# Patient Record
Sex: MALE | Race: WHITE
[De-identification: names, ages, dates, MRNs, and addresses within clinical notes are randomized per-mention and may not be internally consistent; named-entity substitution may affect disease eponyms.]

---

## 2018-08-02 ENCOUNTER — HOSPITAL ENCOUNTER (EMERGENCY)
Dept: HOSPITAL 25 - UCEAST | Age: 73
Discharge: HOME | End: 2018-08-02
Payer: COMMERCIAL

## 2018-08-02 VITALS — DIASTOLIC BLOOD PRESSURE: 71 MMHG | SYSTOLIC BLOOD PRESSURE: 140 MMHG

## 2018-08-02 DIAGNOSIS — I10: ICD-10-CM

## 2018-08-02 DIAGNOSIS — Z79.82: ICD-10-CM

## 2018-08-02 DIAGNOSIS — J40: Primary | ICD-10-CM

## 2018-08-02 PROCEDURE — 96372 THER/PROPH/DIAG INJ SC/IM: CPT

## 2018-08-02 PROCEDURE — 99212 OFFICE O/P EST SF 10 MIN: CPT

## 2018-08-02 PROCEDURE — G0463 HOSPITAL OUTPT CLINIC VISIT: HCPCS

## 2018-08-02 PROCEDURE — 71046 X-RAY EXAM CHEST 2 VIEWS: CPT

## 2018-08-02 NOTE — ED
Respiratory





- HPI Summary


HPI Summary: 


72 female presents with cough and shortness breath for the past 3 days.  He 

states he has history of chronic bronchitis.  He is nonsmoker.  He denies any 

chest pain.  No pain or swelling in calf muscles.  No sore throat.  He states 

that he has been wheezing for the past couple days.  He states is worse with 

exertion.  He does not have an inhaler.  He states wheezing is worse at night.  

He states if catches the symptoms early he normally doesn't have many issues.  

has history of HTN and DM. 








- History of Current Complaint


Chief Complaint: UCRespiratory


Stated Complaint: COUGH CONGESTION


Time Seen by Provider: 08/02/18 12:46


Pain Intensity: 5





- Allergy/Home Medications


Allergies/Adverse Reactions: 


 Allergies











Allergy/AdvReac Type Severity Reaction Status Date / Time


 


No Known Allergies Allergy   Verified 08/02/18 12:40











Home Medications: 


 Home Medications





Aspirin 81 mg CHEW TAB* [Aspirin Low Dose TAB*] 81 mg PO DAILY 08/02/18 [

History Confirmed 08/02/18]


Losartan/Hydrochlorothiazide [Losartan-Hctz 100-12.5 mg Tab] 1 mg PO DAILY WITH 

MEAL 08/02/18 [History Confirmed 08/02/18]


Rosuvastatin Calcium [Crestor] 20 mg PO DAILY WITH MEAL 08/02/18 [History 

Confirmed 08/02/18]


celeCOXIB CAP* [CeleBREX CAP*] 100 mg PO DAILY 08/02/18 [History Confirmed 08/02 /18]


hydroCHLOROthiazide [Hydrochlorothiazide] 12.5 mg PO DAILY WITH MEAL 08/02/18 [

History Confirmed 08/02/18]











PMH/Surg Hx/FS Hx/Imm Hx


Endocrine/Hematology History: Reports: Hx Diabetes - type  II


   Denies: Hx Thyroid Disease


Cardiovascular History: Reports: Hx Hypertension


Respiratory History: 


   Denies: Hx Asthma


GI History: 


   Denies: Hx Ulcer





- Surgical History


Surgery Procedure, Year, and Place: rt ankle 2/19/15 fusion  fibula graft.  pt. 

type 2 dibetic.  bilat hip replacements


Infectious Disease History: No


Infectious Disease History: 


   Denies: Hx Hepatitis, Hx Human Immunodeficiency Virus (HIV), History Other 

Infectious Disease, Traveled Outside the [place] in Last 30 Days





- Family History


Known Family History: Positive: None


   Negative: Respiratory Disease





- Social History


Alcohol Use: Occasionally


Substance Use Type: Reports: None


Smoking Status (MU): Never Smoked Tobacco





Review of Systems


Negative: Fever


Negative: Chest Pain


Positive: Shortness Of Breath, Cough


All Other Systems Reviewed And Are Negative: Yes





Physical Exam


Triage Information Reviewed: Yes


Vital Signs On Initial Exam: 


 Initial Vitals











Temp Pulse Resp BP Pulse Ox


 


 97.9 F   86   22   140/71   97 


 


 08/02/18 12:35  08/02/18 12:35  08/02/18 12:35  08/02/18 12:35  08/02/18 12:35











Vital Signs Reviewed: Yes


Appearance: Positive: Well-Appearing


Skin: Positive: Warm, Dry


Head/Face: Positive: Normal Head/Face Inspection


Eyes: Positive: Normal, EOMI, ALEENA, Conjunctiva Clear


ENT: Positive: Normal ENT inspection, Pharynx normal, TMs normal


Respiratory/Lung Sounds: Positive: Breath Sounds Present, Wheezes


Cardiovascular: Positive: Normal, RRR


Abdomen Description: Positive: Nontender, Soft


Bowel Sounds: Positive: Present


Musculoskeletal: Positive: Normal


Neurological: Positive: Normal


Psychiatric: Positive: Normal





Diagnostics





- Vital Signs


 Vital Signs











  Temp Pulse Resp BP Pulse Ox


 


 08/02/18 12:35  97.9 F  86  22  140/71  97














- Laboratory


Lab Statement: Any lab studies that have been ordered have been reviewed, and 

results considered in the medical decision making process.





- Radiology


  ** chest


Xray Interpretation: No Acute Changes


Radiology Interpretation Completed By: Radiologist





Re-Evaluation





- Re-Evaluation


  ** First Eval


Re-Evaluation Time: 13:29


Change: Improved


Comment: slight wheeze after treatment





Disposition





- Course


Course Of Treatment: 72 female presents with cough and shortness breath for the 

past 3 days.  He states he has history of chronic bronchitis.  He is nonsmoker.

  He denies any chest pain.  No pain or swelling in calf muscles.  No sore 

throat.  He states that he has been wheezing for the past couple days.  He 

states is worse with exertion.  He does not have an inhaler.  He states 

wheezing is worse at night.  on exam heart RRR. wheezing present. gave duoneb 

and improved. chest xray shows NAD. will give steriod and inhaler and 

azithrymoycin. told steriod will increase blood sugar. will have follow up with 

primary about blood pressure as has dx of htn. patient understand and agrees 

with plan.





- Differential Dx - Cardiopulmonary


Differential Diagnoses - Cardiopulmonary: Bronchitis, CHF, Influenza, Lower 

Resp Infection





- Diagnoses


Provider Diagnoses: 


 Bronchitis








Discharge





- Sign-Out/Discharge


Documenting (check all that apply): Patient Departure





- Discharge Plan


Condition: Good


Disposition: HOME


Prescriptions: 


Albuterol HFA INHALER* [Ventolin HFA Inhaler*] 2 puff INH Q4H PRN #1 mdi


 PRN Reason: Wheezing


Azithromycin TAB* [Zithromax TAB (Z-WESTON) 250 mg #6 tabs] 2 tab PO .TODAY, THEN 

1 DAILY #1 west[place]


predniSONE TAB* [Deltasone TAB*] 50 mg PO DAILY #4 tab


Patient Education Materials:  Acute Bronchitis (ED)


Referrals: 


Zulay Forbes MD [Primary Care Provider] - 


Additional Instructions: 


Use inhaler up to two puffs every 4 hours for cough and wheezing


Take steroid once a day for 5 days


Take antibiotic two tablets day one, one tablet day 2-5


Take Tylenol  for pain every 6 hours


Follow up with primary within 5 days


Return to ED if develop severe shortness of breath, worsening chest pain, or 

any new or worsening symptoms





- Billing Disposition and Condition


Condition: GOOD


Disposition: Home





Attestation Statement


User Type: Provider - I was available for consult. This patient was seen by the 

ASHLEIGH. The patient was not presented to, seen by, or examined by me. -Garrett

## 2018-08-02 NOTE — RAD
Indication: Cough, shortness of breath.



2 views of the chest including dual energy PA views demonstrate no mediastinal shift.

Heart is of normal size and configuration. When compared to previous exam of April 28, 2012 no significant change is noted.



IMPRESSION: No active disease is noted. No changes noted since April 28, 2012.

## 2018-09-04 ENCOUNTER — HOSPITAL ENCOUNTER (EMERGENCY)
Dept: HOSPITAL 25 - UCEAST | Age: 73
Discharge: HOME | End: 2018-09-04
Payer: MEDICARE

## 2018-09-04 VITALS — SYSTOLIC BLOOD PRESSURE: 136 MMHG | DIASTOLIC BLOOD PRESSURE: 87 MMHG

## 2018-09-04 DIAGNOSIS — L30.9: ICD-10-CM

## 2018-09-04 DIAGNOSIS — Z96.643: ICD-10-CM

## 2018-09-04 DIAGNOSIS — J20.9: Primary | ICD-10-CM

## 2018-09-04 PROCEDURE — G0463 HOSPITAL OUTPT CLINIC VISIT: HCPCS

## 2018-09-04 PROCEDURE — 99212 OFFICE O/P EST SF 10 MIN: CPT

## 2018-09-04 PROCEDURE — 71046 X-RAY EXAM CHEST 2 VIEWS: CPT

## 2018-09-04 NOTE — UC
Respiratory Complaint HPI





- HPI Summary


HPI Summary: 





Pleasant 73 yo gentleman c/o progressive worse cough, wheezing, green 

productive sputum over the last 2-3 weeks.  Presents today b/c not better, 

getting worse, hard to sleep through the night.  Without pnd.  No fever.  No GI 

issues.  Recently exposed to poison sumac, has been using a relative's 

triamcinolone cream, requests refill.  Recently completed azithromycin last 

month for similar sx, sx improved but returned after several days.  Albuterol 

has helped, nearly out.  No issues with blood sugars, reports that they have 

been "running ok."   





- History of Current Complaint


Chief Complaint: UCRespiratory


Stated Complaint: CHEST CONGESTION


Time Seen by Provider: 09/04/18 14:03


Hx Obtained From: Patient


Pain Intensity: 0





- Allergies/Home Medications


Allergies/Adverse Reactions: 


 Allergies











Allergy/AdvReac Type Severity Reaction Status Date / Time


 


poison sumac extract Allergy  Rash Verified 09/04/18 13:38














PMH/Surg Hx/FS Hx/Imm Hx





- Additional Past Medical History


Additional PMH: 





+ DM,  + hx pneumonia, + hx bronchitis, + bilat hip replacement ,+ R ankle 

fusion years ago, wears afo 


Previously Healthy: No - see above





- Surgical History


Surgical History: Yes


Surgery Procedure, Year, and Place: rt ankle 2/19/15 fusion  fibula graft.  pt. 

type 2 dibetic.  bilat hip replacements





- Family History


Known Family History: Positive: None


   Negative: Respiratory Disease





- Social History


Alcohol Use: Occasionally


Substance Use Type: None


Smoking Status (MU): Never Smoked Tobacco





Review of Systems


Constitutional: Negative


Skin: Other - see hpi


Eyes: Negative


ENT: Other - cerumen full


Respiratory: Shortness Of Breath, Cough


Gastrointestinal: Negative


Genitourinary: Negative


Motor: Other - no new issues, + chronic issues


Neurovascular: Negative


Musculoskeletal: Negative - none new


Neurological: Negative


Psychological: Negative


Is Patient Immunocompromised?: No


All Other Systems Reviewed And Are Negative: Yes





Physical Exam


Triage Information Reviewed: Yes


Appearance: Well-Appearing, Well-Nourished


Vital Signs: 


 Initial Vital Signs











Temp  98.3 F   09/04/18 13:50


 


Pulse  72   09/04/18 13:50


 


Resp  18   09/04/18 13:50


 


BP  136/87   09/04/18 13:50


 


Pulse Ox  99   09/04/18 13:50











Vital Signs Reviewed: Yes


Neck exam: Normal


Respiratory Exam: Other - no stridor, no rhonchi, exp>insp wheeze


Respiratory: Positive: No respiratory distress, No accessory muscle use, 

Wheezing


Cardiovascular Exam: Normal


Cardiovascular: Positive: RRR, No Murmur, Pulses Normal, Brisk Capillary Refill


Abdominal Exam: Normal


Abdomen Description: Positive: Nontender


Musculoskeletal Exam: Other - R ankle splint, not examined.  Self ambulates.  

Moves x 4 ext's.


Neurological Exam: Normal - grossly nonfocal


Psychological Exam: Normal - conversing easily and appropriately


Skin Exam: Other - good cap refill.  Mild dermatitis scattered on back, c/w 

topical exposure





UC Diagnostic Evaluation





- Laboratory


O2 Sat by Pulse Oximetry: 99





Respiratory Course/Dx





- Course


Course Of Treatment: CXR ordered.  Has appt with PCP at [facility] tomorrow 

afternoon, advised to keep this appt.  Refill triamcinolone, albuterol.  Chest 

xray reviewed.  D/w Mr. Johnson. Without pneumonia.  Rx ciprofloxacin, 

prednisone taper, d/w pt.  Questions as posed answered to the best of my 

ability.





- Differential Dx/Diagnosis


Provider Diagnoses: Acute bronchitis with wheezing.  Dermatitis





Discharge





- Sign-Out/Discharge


Documenting (check all that apply): Patient Departure


All imaging exams completed and their final reports reviewed: Yes





- Discharge Plan


Condition: Stable


Disposition: HOME


Prescriptions: 


Albuterol HFA INHALER* [Ventolin HFA Inhaler*] 1 - 2 puff INH Q4H PRN #1 mdi


 PRN Reason: Wheezing


Ciprofloxacin TAB* [Cipro 500 MG TAB*] 500 mg PO BID #14 tab


predniSONE TAB* [Deltasone 10 MG TAB*] 10 mg PO DAILY #20 tab


Triamcinolone 0.1% CREAM (NF) [Kenalog 0.1% Cream (NF)] 1 applic TOPICAL BID #1 

tube


Patient Education Materials:  Acute Bronchitis (ED), Reactive Airways Disease (

ED), Wheezing (ED)


Referrals: 


Zulay Forbes MD [Primary Care Provider] - 


Additional Instructions: 


Follow up with your primary care physician, this week as scheduled.  


Seek medical attention for worse or new problems.  





Drink plenty of water.  


Check your blood sugars frequently; prednisone will increase your blood glucose

, so pay close attention to blood glucose. 





- Billing Disposition and Condition


Condition: STABLE


Disposition: Home

## 2018-09-04 NOTE — RAD
HISTORY: progressive worse cough, wheeze



COMPARISONS: August 02, 2018 



VIEWS: 4: Frontal dual-energy and lateral views of the chest.



FINDINGS:

CARDIOMEDIASTINAL SILHOUETTE: The aorta is tortuous. The cardiomediastinal silhouette is

otherwise unremarkable.

STEVE: The steve are normal.

PLEURA: The costophrenic angles are sharp. No pleural abnormalities are noted.

LUNG PARENCHYMA: The lungs are clear.

ABDOMEN: The upper abdomen is clear. There is no subphrenic gas.

BONES AND SOFT TISSUES: Degenerative changes are noted along the spine.

OTHER: None.



IMPRESSION:

NO ACTIVE CARDIOPULMONARY DISEASE.

## 2018-10-31 ENCOUNTER — HOSPITAL ENCOUNTER (EMERGENCY)
Dept: HOSPITAL 25 - UCEAST | Age: 73
Discharge: HOME | End: 2018-10-31
Payer: MEDICARE

## 2018-10-31 VITALS — SYSTOLIC BLOOD PRESSURE: 147 MMHG | DIASTOLIC BLOOD PRESSURE: 85 MMHG

## 2018-10-31 DIAGNOSIS — J40: Primary | ICD-10-CM

## 2018-10-31 DIAGNOSIS — Z96.643: ICD-10-CM

## 2018-10-31 DIAGNOSIS — E11.9: ICD-10-CM

## 2018-10-31 DIAGNOSIS — Z79.84: ICD-10-CM

## 2018-10-31 DIAGNOSIS — I10: ICD-10-CM

## 2018-10-31 DIAGNOSIS — N40.0: ICD-10-CM

## 2018-10-31 PROCEDURE — 99211 OFF/OP EST MAY X REQ PHY/QHP: CPT

## 2018-10-31 PROCEDURE — 71046 X-RAY EXAM CHEST 2 VIEWS: CPT

## 2018-10-31 PROCEDURE — G0463 HOSPITAL OUTPT CLINIC VISIT: HCPCS

## 2018-10-31 NOTE — UC
Respiratory Complaint HPI





- HPI Summary


HPI Summary: 


Patient complaining of bronchitis symptoms - cough, chest congestion, wheeze - 

for the past 3 months.  On 8/2/18 was treated with a Z-Hardeep and prednisone.  On 9 /4/18 he was treated with Cipro and prednisone.  On 10/8/18 he was treated with 

doxycycline.  Patient states his symptoms improve a little bit after each round 

of antibiotics but then return.  He states he is not getting worse but he is 

certainly not improving.  Over the past couple of days he has developed 

pleuritic pain in his left mid back.  He denies chest pain, nausea/vomiting but 

does endorse some shortness of breath and wheeze.  No fever.  Oxygen saturation 

is normal.  Patient is a nonsmoker.





- History of Current Complaint


Chief Complaint: UCRespiratory


Stated Complaint: CHEST CONGESTION COUGH


Time Seen by Provider: 10/31/18 13:50


Hx Obtained From: Patient


Onset/Duration: Gradual Onset, Lasting Weeks


Timing: Constant


Severity Initially: Moderate


Severity Currently: Moderate


Pain Intensity: 8


Pain Scale Used: 0-10 Numeric


Character: Cough: Productive


Aggravating Factors: Exertion, Recumbent Position


Alleviating Factors: Bronchodilator


Associated Signs And Symptoms: Positive: Dyspnea, Wheezing, Nasal Congestion





- Allergies/Home Medications


Allergies/Adverse Reactions: 


 Allergies











Allergy/AdvReac Type Severity Reaction Status Date / Time


 


poison sumac extract Allergy  Rash Verified 10/31/18 13:28











Home Medications: 


 Home Medications





Amlodipine Besylate/Benazepril [Amlodipine Besylate/Benaz 5-40 mg-] 1 tab PO 

DAILY 10/31/18 [History Confirmed 10/31/18]


Finasteride 5 mg PO DAILY 10/31/18 [History Confirmed 10/31/18]


Glipizid/Metformin 2.5/500(NF) [Metaglip 2.5/500(NF)] 1 tab PO BID 10/31/18 [

History Confirmed 10/31/18]


Ibuprofen 200 mg PO DAILY 10/31/18 [History Confirmed 10/31/18]


Terbinafine [Lamisil At]  10/31/18 [History]


Zolpidem TAB* [Ambien*] 5 mg PO BEDTIME PRN 10/31/18 [History Confirmed 10/31/18

]











PMH/Surg Hx/FS Hx/Imm Hx


Endocrine History: Diabetes


Cardiovascular History: Hypertension


Other GI/ History: BPH





- Surgical History


Surgical History: Yes


Surgery Procedure, Year, and Place: rt ankle 2/19/15 fusion  fibula graft.  pt. 

type 2 dibetic.  bilat hip replacements





- Family History


Known Family History: Positive: None


   Negative: Respiratory Disease





- Social History


Alcohol Use: Occasionally


Substance Use Type: None


Smoking Status (MU): Never Smoked Tobacco





Review of Systems


Constitutional: Negative


Respiratory: Shortness Of Breath, Cough, Other - WHEEZE, CONGESTION


Cardiovascular: Negative


Gastrointestinal: Negative


All Other Systems Reviewed And Are Negative: Yes





Physical Exam


Triage Information Reviewed: Yes


Appearance: Well-Appearing, No Pain Distress, Well-Nourished


Vital Signs: 


 Initial Vital Signs











Temp  98.2 F   10/31/18 13:23


 


Pulse  94   10/31/18 13:23


 


Resp  18   10/31/18 13:23


 


BP  147/85   10/31/18 13:23


 


Pulse Ox  98   10/31/18 13:23











Vital Signs Reviewed: Yes


Eyes: Positive: Conjunctiva Clear


ENT: Positive: Hearing grossly normal


Neck: Positive: Supple, Nontender, No Lymphadenopathy


Respiratory: Positive: No respiratory distress, No accessory muscle use, 

Wheezing - MILD DIFFUSE EXPIRATORY WHEEZE


Cardiovascular Exam: Normal


Abdomen Description: Positive: Soft


Musculoskeletal: Positive: No Edema


Neurological: Positive: Alert


Psychological: Positive: Age Appropriate Behavior


Skin: Negative: rashes





UC Diagnostic Evaluation





- Laboratory


O2 Sat by Pulse Oximetry: 98





- Radiology


Radiology Interpretation Completed By: Radiologist


Summary of Radiographic Findings: CXR UNREMARKABLE





Respiratory Course/Dx





- Differential Dx/Diagnosis


Provider Diagnoses: BRONCHITIS/WHEEZE





Discharge





- Sign-Out/Discharge


Documenting (check all that apply): Patient Departure


All imaging exams completed and their final reports reviewed: Yes





- Discharge Plan


Condition: Stable


Disposition: HOME


Patient Education Materials:  Acute Bronchitis (ED), Wheezing (ED)


Referrals: 


Zulay Forbes MD [Primary Care Provider] - If Needed


Yue Phillips MD [Medical Doctor] - 1 Day


Additional Instructions: 


CHEST X-RAY TODAY READ AS UNREMARKABLE.  YOU HAVE HAD 3 ROUNDS OF ANTIBIOTICS, 

2 ROUNDS OF STEROIDS AND YOUR SYMPTOMS ARE PERSISTENT.  I WOULD HESITATE TO PUT 

YOU ON A FOURTH ROUND OF ANTIBIOTICS.  AT THIS STAGE YOU MAY BENEFIT FROM MORE 

ADVANCED IMAGING AND A PULMONOLOGY CONSULT.  WE HAVE FAXED YOUR DEMOGRAPHIC 

INFORMATION ALONG WITH THE PROGRESS NOTE FROM TODAY'S VISIT TO DR. PHILLIPS'S 

OFFICE.  THEY SHOULD BE CALLING YOU TODAY OR TOMORROW TO SCHEDULE AN 

APPOINTMENT.  IF YOU DO NOT HEAR FROM THEM PLEASE CALL US HERE TO ASSIST YOU IN 

THIS MATTER.  PLEASE GO TO THE ED WITHOUT FAIL IF YOU DEVELOP WORSENING 

SHORTNESS OF BREATH, CHEST PAIN, FEVER, DIZZINESS OR ANY OTHER CONCERNING 

SYMPTOMS.





- Billing Disposition and Condition


Condition: STABLE


Disposition: Home

## 2018-10-31 NOTE — RAD
HISTORY: COUGH, PLEURITIC PAIN



COMPARISONS: September 04, 2018 



VIEWS: 4: Frontal dual-energy and lateral views of the chest.



FINDINGS:

CARDIOMEDIASTINAL SILHOUETTE: The aorta is tortuous. The cardiomediastinal silhouette is

otherwise unremarkable.

STEVE: The steve are normal.

PLEURA: The costophrenic angles are sharp. No pleural abnormalities are noted.

LUNG PARENCHYMA: The lungs are clear.

ABDOMEN: The upper abdomen is clear. There is no subphrenic gas.

BONES AND SOFT TISSUES: Degenerative changes are noted along the spine.

OTHER: None.



IMPRESSION:

NO ACTIVE CARDIOPULMONARY DISEASE.

## 2018-12-21 ENCOUNTER — HOSPITAL ENCOUNTER (EMERGENCY)
Dept: HOSPITAL 25 - ED | Age: 73
Discharge: HOME | End: 2018-12-21
Payer: MEDICARE

## 2018-12-21 VITALS — SYSTOLIC BLOOD PRESSURE: 137 MMHG | DIASTOLIC BLOOD PRESSURE: 89 MMHG

## 2018-12-21 DIAGNOSIS — I10: ICD-10-CM

## 2018-12-21 DIAGNOSIS — E11.9: ICD-10-CM

## 2018-12-21 DIAGNOSIS — J18.9: Primary | ICD-10-CM

## 2018-12-21 LAB
ALBUMIN SERPL BCG-MCNC: 4.1 G/DL (ref 3.2–5.2)
ALBUMIN/GLOB SERPL: 1.4 {RATIO} (ref 1–3)
ALP SERPL-CCNC: 60 U/L (ref 34–104)
ALT SERPL W P-5'-P-CCNC: 15 U/L (ref 7–52)
ANION GAP SERPL CALC-SCNC: 8 MMOL/L (ref 2–11)
AST SERPL-CCNC: 16 U/L (ref 13–39)
BASOPHILS # BLD AUTO: 0 10^3/UL (ref 0–0.2)
BUN SERPL-MCNC: 19 MG/DL (ref 6–24)
BUN/CREAT SERPL: 25 (ref 8–20)
CALCIUM SERPL-MCNC: 9.6 MG/DL (ref 8.6–10.3)
CHLORIDE SERPL-SCNC: 103 MMOL/L (ref 101–111)
EOSINOPHIL # BLD AUTO: 0.2 10^3/UL (ref 0–0.6)
GLOBULIN SER CALC-MCNC: 2.9 G/DL (ref 2–4)
GLUCOSE SERPL-MCNC: 266 MG/DL (ref 70–100)
HCO3 SERPL-SCNC: 25 MMOL/L (ref 22–32)
HCT VFR BLD AUTO: 36 % (ref 42–52)
HGB BLD-MCNC: 12.6 G/DL (ref 14–18)
LYMPHOCYTES # BLD AUTO: 1.7 10^3/UL (ref 1–4.8)
MAGNESIUM SERPL-MCNC: 1.4 MG/DL (ref 1.9–2.7)
MCH RBC QN AUTO: 30 PG (ref 27–31)
MCHC RBC AUTO-ENTMCNC: 35 G/DL (ref 31–36)
MCV RBC AUTO: 85 FL (ref 80–94)
MONOCYTES # BLD AUTO: 0.7 10^3/UL (ref 0–0.8)
NEUTROPHILS # BLD AUTO: 5.9 10^3/UL (ref 1.5–7.7)
NRBC # BLD AUTO: 0 10^3/UL
NRBC BLD QL AUTO: 0.1
PLATELET # BLD AUTO: 267 10^3/UL (ref 150–450)
POTASSIUM SERPL-SCNC: 3.6 MMOL/L (ref 3.5–5)
PROT SERPL-MCNC: 7 G/DL (ref 6.4–8.9)
RBC # BLD AUTO: 4.16 10^6/UL (ref 4–5.4)
SODIUM SERPL-SCNC: 136 MMOL/L (ref 135–145)
WBC # BLD AUTO: 8.6 10^3/UL (ref 3.5–10.8)

## 2018-12-21 PROCEDURE — 84484 ASSAY OF TROPONIN QUANT: CPT

## 2018-12-21 PROCEDURE — 93005 ELECTROCARDIOGRAM TRACING: CPT

## 2018-12-21 PROCEDURE — 96375 TX/PRO/DX INJ NEW DRUG ADDON: CPT

## 2018-12-21 PROCEDURE — 96374 THER/PROPH/DIAG INJ IV PUSH: CPT

## 2018-12-21 PROCEDURE — 71275 CT ANGIOGRAPHY CHEST: CPT

## 2018-12-21 PROCEDURE — 83605 ASSAY OF LACTIC ACID: CPT

## 2018-12-21 PROCEDURE — 87070 CULTURE OTHR SPECIMN AEROBIC: CPT

## 2018-12-21 PROCEDURE — 83735 ASSAY OF MAGNESIUM: CPT

## 2018-12-21 PROCEDURE — 85379 FIBRIN DEGRADATION QUANT: CPT

## 2018-12-21 PROCEDURE — 87040 BLOOD CULTURE FOR BACTERIA: CPT

## 2018-12-21 PROCEDURE — 99283 EMERGENCY DEPT VISIT LOW MDM: CPT

## 2018-12-21 PROCEDURE — 86140 C-REACTIVE PROTEIN: CPT

## 2018-12-21 PROCEDURE — 85025 COMPLETE CBC W/AUTO DIFF WBC: CPT

## 2018-12-21 PROCEDURE — 80053 COMPREHEN METABOLIC PANEL: CPT

## 2018-12-21 PROCEDURE — 36415 COLL VENOUS BLD VENIPUNCTURE: CPT

## 2018-12-21 PROCEDURE — 87205 SMEAR GRAM STAIN: CPT

## 2018-12-21 PROCEDURE — 83880 ASSAY OF NATRIURETIC PEPTIDE: CPT

## 2018-12-21 RX ADMIN — MAGNESIUM SULFATE IN WATER ONE MLS/HR: 40 INJECTION, SOLUTION INTRAVENOUS at 21:00

## 2018-12-21 RX ADMIN — IPRATROPIUM BROMIDE AND ALBUTEROL SULFATE ONE NEB: .5; 3 SOLUTION RESPIRATORY (INHALATION) at 19:04

## 2018-12-21 RX ADMIN — IODIXANOL ONE ML: 320 INJECTION, SOLUTION INTRAVASCULAR at 20:03

## 2018-12-21 RX ADMIN — ALBUTEROL SULFATE ONE PUFF: 108 AEROSOL, METERED RESPIRATORY (INHALATION) at 22:23

## 2018-12-21 RX ADMIN — LEVOFLOXACIN ONE MG: 250 TABLET, FILM COATED ORAL at 22:22

## 2018-12-21 NOTE — ED
Respiratory





- HPI Summary


HPI Summary: 


73-year-old male presents with acute on chronic cough for the past couple days.

  He states he has a history of bronchitis since August and has been under 

multiple antibiotics.  His last antibiotic was 6-8 weeks ago.  He states he is 

being followed up by Dr. go.  He states that last night the cough became 

worse.  He feels that his chest is tight though.  He admits to shortness 

breath.  He admits abdominal pain from coughing.  No nausea vomiting.  He 

admits to sinus congestion and a sore throat.  Sore throat as resolved.  No 

headache or dizziness.  is not a Smoker.  Currently has been using an inhaler.  

He states the inhaler helps.  No one else is sick.  He admits to low-grade 

fever.  Has a history of diabetes.








- History of Current Complaint


Chief Complaint: EDChestWallPain


Stated Complaint: TROUBLE BREATHING POSSIBLE INFECTION


Time Seen by Provider: 12/21/18 17:58


Pain Intensity: 2





- Allergy/Home Medications


Allergies/Adverse Reactions: 


 Allergies











Allergy/AdvReac Type Severity Reaction Status Date / Time


 


poison sumac extract Allergy  Rash Verified 12/21/18 14:48











Home Medications: 


 Home Medications





Acetaminophen [Tylenol Extra Strength] 500 - 1,000 mg PO Q8HR PRN 12/21/18 [

History Confirmed 12/21/18]


Saxagliptin HCl (Nf) [Onglyza (NF)] 2.5 mg PO DAILY 12/21/18 [History Confirmed 

12/21/18]











PMH/Surg Hx/FS Hx/Imm Hx


Endocrine/Hematology History: Reports: Hx Diabetes - type II


   Denies: Hx Anticoagulant Therapy, Hx Thyroid Disease


Cardiovascular History: Reports: Hx Hypertension


Respiratory History: Reports: Other Respiratory Problems/Disorders - chronic 

bronchitis


   Denies: Hx Asthma, Hx Chronic Obstructive Pulmonary Disease (COPD)


GI History: 


   Denies: Hx Ulcer





- Surgical History


Surgery Procedure, Year, and Place: rt ankle 2/19/15 fusion  fibula graft.  pt. 

type 2 dibetic.  bilat hip replacements


Infectious Disease History: No


Infectious Disease History: 


   Denies: Hx Hepatitis, Hx Human Immunodeficiency Virus (HIV), History Other 

Infectious Disease, Traveled Outside the [place] in Last 30 Days





- Family History


Known Family History: Positive: None


   Negative: Respiratory Disease





- Social History


Alcohol Use: Occasionally


Substance Use Type: Reports: None


Hx Tobacco Use: No


Smoking Status (MU): Never Smoked Tobacco





Review of Systems


Negative: Fever


Negative: Chest Pain


Positive: Shortness Of Breath, Cough


Negative: Abdominal Pain


All Other Systems Reviewed And Are Negative: Yes





Physical Exam


Triage Information Reviewed: Yes


Vital Signs On Initial Exam: 


 Initial Vitals











Temp Pulse Resp BP Pulse Ox


 


 97.9 F   92   16   150/76   95 


 


 12/21/18 14:41  12/21/18 14:41  12/21/18 14:41  12/21/18 14:41  12/21/18 14:41











Vital Signs Reviewed: Yes


Appearance: Positive: Well-Appearing


Skin: Positive: Warm, Dry


Head/Face: Positive: Normal Head/Face Inspection


Eyes: Positive: Normal, EOMI, ALEENA, Conjunctiva Clear


ENT: Positive: Normal ENT inspection, Pharynx normal, TMs normal


Respiratory/Lung Sounds: Positive: Breath Sounds Present, Wheezes - mild


Cardiovascular: Positive: Normal, RRR


Abdomen Description: Positive: Nontender, Soft


Bowel Sounds: Positive: Present


Musculoskeletal: Positive: Normal


Neurological: Positive: Normal


Psychiatric: Positive: Normal





Diagnostics





- Vital Signs


 Vital Signs











  Temp Pulse Resp BP Pulse Ox


 


 12/21/18 14:41  97.9 F  92  16  150/76  95














- Laboratory


Result Diagrams: 


 12/21/18 18:19





 12/21/18 18:19


Lab Statement: Any lab studies that have been ordered have been reviewed, and 

results considered in the medical decision making process.





- CT


  ** chest


CT Interpretation Completed By: Radiologist


Summary of CT Findings: IMPRESSION:  1. There is a region of consolidation as 

well as interstitial opacity and.  nodular opacities in the right upper lobe 

suspicious for pneumonitis, possible.  atypical pneumonitis with the largest of 

the nodular opacities measuring up to.  2.5 cm diameter. There is an additional 

indeterminate right middle lobe.  pulmonary nodule measuring 1 cm.For low and 

high risk patients, CT at 3-6.  months, then consider CT at 18-24 months. For 

high risk patients, consider CT.  at 3-6 months, then CT at 18-24 months.  2. 

No visible acute pulmonary embolism.  3. There is borderline mediastinal and 

right hilar lymphadenopathy.





- EKG


  ** No standard instances


Cardiac Rate: NL


EKG Rhythm: Sinus Rhythm


Summary of EKG Findings: sinus rhythm, anteroseptal infarct old





Re-Evaluation





- Re-Evaluation


  ** First Eval


Re-Evaluation Time: 19:34


Change: Improved


Comment: feeling better after treatment





  ** Second Eval


Re-Evaluation Time: 22:05


Change: Improved


Comment: did not destat when walked around, patient feels comfortable going home





Disposition





- Course


Course Of Treatment: 73 year old male presents with worsening cough for the 

past day.  He states he's been on multiple antibiotics to try to treat cough.  

The cough is worse since last night.  He notes occasional shortness breath.  No 

chest pain.  He is not a smoker.  Has history of diabetes.  On exam mild 

wheezing noted.  Pharynx normal abdomen soft nontender.  Sinus congestion 

noted.  White blood cell count normal.  D-dimer is elevated.  EKG shows sinus 

rhythm. cta chest shows pneumonitis. discussed case with dr elmore. will place 

on levaquin and have follow up with pulmonology. gave inhaler. vitals stable 

and patient feels comfortable with discharge. warned is spikes fevers or 

worsening sob to return. patient understand and agrees with plan.





- Differential Dx - Cardiopulmonary


Differential Diagnoses - Cardiopulmonary: Bronchitis, Lower Resp Infection, 

Pulmonary Embolism





- Diagnoses


Provider Diagnoses: 


 Pneumonitis








Discharge





- Sign-Out/Discharge


Documenting (check all that apply): Patient Departure





- Discharge Plan


Condition: Good


Disposition: HOME


Prescriptions: 


Albuterol HFA INHALER* [Ventolin HFA Inhaler*] 2 puff INH Q4H PRN #1 mdi


 PRN Reason: Wheezing


Levofloxacin TAB* [Levaquin TAB*] 750 mg PO DAILY #6 tab


Patient Education Materials:  Pneumonia (ED)


Referrals: 


Zulay Forbes MD [Primary Care Provider] - 


Yue Go MD [Medical Doctor] - 


Additional Instructions: 


Use inhaler up to two puffs every 4 hours for cough and wheezing


Take antibiotic once daily starting tomorrow for 6 days


Take Tylenol every 6 hours


Follow up with dr go as scheduled


Return to ED if develop severe shortness of breath, worsening chest pain, or 

any new or worsening symptoms





- Billing Disposition and Condition


Condition: GOOD


Disposition: Home

## 2019-07-06 ENCOUNTER — HOSPITAL ENCOUNTER (EMERGENCY)
Dept: HOSPITAL 25 - ED | Age: 74
LOS: 1 days | Discharge: HOME | End: 2019-07-07
Payer: MEDICARE

## 2019-07-06 DIAGNOSIS — I10: ICD-10-CM

## 2019-07-06 DIAGNOSIS — E83.42: ICD-10-CM

## 2019-07-06 DIAGNOSIS — E11.9: ICD-10-CM

## 2019-07-06 DIAGNOSIS — R55: Primary | ICD-10-CM

## 2019-07-06 LAB
ALBUMIN SERPL BCG-MCNC: 4.3 G/DL (ref 3.2–5.2)
ALBUMIN/GLOB SERPL: 1.8 {RATIO} (ref 1–3)
ALP SERPL-CCNC: 46 U/L (ref 34–104)
ALT SERPL W P-5'-P-CCNC: 11 U/L (ref 7–52)
ANION GAP SERPL CALC-SCNC: 9 MMOL/L (ref 2–11)
AST SERPL-CCNC: 16 U/L (ref 13–39)
BASOPHILS # BLD AUTO: 0 10^3/UL (ref 0–0.2)
BUN SERPL-MCNC: 20 MG/DL (ref 6–24)
BUN/CREAT SERPL: 26 (ref 8–20)
CALCIUM SERPL-MCNC: 9.7 MG/DL (ref 8.6–10.3)
CHLORIDE SERPL-SCNC: 104 MMOL/L (ref 101–111)
EOSINOPHIL # BLD AUTO: 0.1 10^3/UL (ref 0–0.6)
GLOBULIN SER CALC-MCNC: 2.4 G/DL (ref 2–4)
GLUCOSE SERPL-MCNC: 149 MG/DL (ref 70–100)
HCO3 SERPL-SCNC: 26 MMOL/L (ref 22–32)
HCT VFR BLD AUTO: 36 % (ref 42–52)
HGB BLD-MCNC: 12.8 G/DL (ref 14–18)
LYMPHOCYTES # BLD AUTO: 1.3 10^3/UL (ref 1–4.8)
MCH RBC QN AUTO: 31 PG (ref 27–31)
MCHC RBC AUTO-ENTMCNC: 36 G/DL (ref 31–36)
MCV RBC AUTO: 87 FL (ref 80–94)
MONOCYTES # BLD AUTO: 0.5 10^3/UL (ref 0–0.8)
NEUTROPHILS # BLD AUTO: 5.6 10^3/UL (ref 1.5–7.7)
NRBC # BLD AUTO: 0 10^3/UL
NRBC BLD QL AUTO: 0
PLATELET # BLD AUTO: 194 10^3/UL (ref 150–450)
POTASSIUM SERPL-SCNC: 3.8 MMOL/L (ref 3.5–5)
PROT SERPL-MCNC: 6.7 G/DL (ref 6.4–8.9)
RBC # BLD AUTO: 4.13 10^6 /UL (ref 4.18–5.48)
SODIUM SERPL-SCNC: 139 MMOL/L (ref 135–145)
TROPONIN I SERPL-MCNC: 0.01 NG/ML (ref ?–0.04)
WBC # BLD AUTO: 7.5 10^3/UL (ref 3.5–10.8)

## 2019-07-06 PROCEDURE — 99284 EMERGENCY DEPT VISIT MOD MDM: CPT

## 2019-07-06 PROCEDURE — 85025 COMPLETE CBC W/AUTO DIFF WBC: CPT

## 2019-07-06 PROCEDURE — 36415 COLL VENOUS BLD VENIPUNCTURE: CPT

## 2019-07-06 PROCEDURE — 96374 THER/PROPH/DIAG INJ IV PUSH: CPT

## 2019-07-06 PROCEDURE — 81003 URINALYSIS AUTO W/O SCOPE: CPT

## 2019-07-06 PROCEDURE — 71045 X-RAY EXAM CHEST 1 VIEW: CPT

## 2019-07-06 PROCEDURE — 83605 ASSAY OF LACTIC ACID: CPT

## 2019-07-06 PROCEDURE — 80053 COMPREHEN METABOLIC PANEL: CPT

## 2019-07-06 PROCEDURE — 84484 ASSAY OF TROPONIN QUANT: CPT

## 2019-07-06 PROCEDURE — 83735 ASSAY OF MAGNESIUM: CPT

## 2019-07-06 PROCEDURE — 93005 ELECTROCARDIOGRAM TRACING: CPT

## 2019-07-06 PROCEDURE — 96372 THER/PROPH/DIAG INJ SC/IM: CPT

## 2019-07-06 NOTE — ED
Syncope/Near Syncope





- HPI Summary


HPI Summary: 


The patient is a 72 y/o M presenting to [facility] with a chief complaint of sudden 

onset near syncope at 1700. He reports that he was outside grilling with 

friends and drinking a beer when he began to feel dizzy. He then started to 

have a syncopal episode, but his friends caught him before he hit his head. 

There is some LOC with the event, but he remembers most of it. He notes that he 

also became pale and diaphoretic, but he denies fever, chills, SOB, CP, 

palpitations, cough, vision changes, motor changes, numbness, and new edema. He 

also reports left eye erythema. He thought the event was secondary to a sudden 

change in blood glucose as he is Type II diabetic, but he went to [facility] 

before the ED, and his glucose was normal. He reports that he's had occasional 

CP that radiates into the neck and jaw over the past few weeks while lying down

, but it was not present today. He had a nuclear stress test within the last 

two years. Hx of DM type II, HTN, chronic bronchitis. Nonsmoker, occasional EtOH

, no substance use.





- History Of Current Complaint


Chief Complaint: EDSyncope


Time Seen by Provider: 07/06/19 20:56


Hx Obtained From: Patient


Onset/Duration: Sudden Onset, Lasting Minutes, Resolved


Timing: Minutes


Context: Witnessed


Activity At Onset: Other - grilling


Associated Head Trauma: No


Aggravating Factor(s): Nothing


Alleviating Factor(s): Nothing


Associated Signs And Symptoms: Other - POSITIVE: dizziness, pale, diaphoresis, 

left eye erythema, some LOC with syncope; NEGATIVE: fever, chills, SOB, CP, 

palpitations, cough, vision changes, motor changes, numbness, new edema





- Allergies/Home Medications


Allergies/Adverse Reactions: 


 Allergies











Allergy/AdvReac Type Severity Reaction Status Date / Time


 


poison sumac extract Allergy  Rash Verified 12/21/18 14:48














PMH/Surg Hx/FS Hx/Imm Hx


Endocrine/Hematology History: Reports: Hx Diabetes - type II


   Denies: Hx Anticoagulant Therapy, Hx Thyroid Disease


Cardiovascular History: Reports: Hx Hypertension


Respiratory History: Reports: Other Respiratory Problems/Disorders - chronic 

bronchitis


   Denies: Hx Asthma, Hx Chronic Obstructive Pulmonary Disease (COPD)


GI History: 


   Denies: Hx Ulcer





- Surgical History


Surgery Procedure, Year, and Place: rt ankle 2/19/15 fusion  fibula graft.  pt. 

type 2 dibetic.  bilat hip replacements


Infectious Disease History: No


Infectious Disease History: 


   Denies: Hx Hepatitis, Hx Human Immunodeficiency Virus (HIV), History Other 

Infectious Disease, Traveled Outside the [place] in Last 30 Days





- Family History


Known Family History: Positive: Diabetes


   Negative: Respiratory Disease





- Social History


Alcohol Use: Occasionally


Hx Substance Use: No


Substance Use Type: Reports: None


Hx Tobacco Use: No


Smoking Status (MU): Never Smoked Tobacco





Review of Systems


Positive: Skin Diaphoresis.  Negative: Fever, Chills


Positive: Erythema - left, Other.  Negative: Blurred Vision


Negative: Palpitations, Chest Pain


Negative: Shortness Of Breath


Negative: Edema


Positive: Other - pale


Neurological: Other - POSITIVE: dizziness; NEGATIVE: motor changes


Positive: Syncope - some LOC.  Negative: Headache, Numbness


All Other Systems Reviewed And Are Negative: Yes





Physical Exam





- Summary


Physical Exam Summary: 


Constitutional: Well-developed, Well-nourished, Alert. (-) Distressed


Skin: Warm, Dry


HENT: Normocephalic; Atraumatic


Eyes: Conjunctiva normal


Neck: Musculoskeletal ROM normal neck. (-) JVD, (-) Stridor, (-) Tracheal 

deviation


Cardio: Rhythm regular, rate normal, Heart sounds normal; Intact distal pulses; 

The pedal pulses are 2+ and symmetric. Radial pulses are 2+ and symmetric. (-) 

Murmur


Pulmonary/Chest wall: Effort normal. (-) Respiratory distress, (-) Wheezes, (-) 

Rales


Abd: Soft, (-) tenderness, (-) Distension, (-) Guarding, (-) Rebound


Musculoskeletal: (-) Edema


Lymph: (-) Cervical adenopathy


Neuro: Alert, Oriented x3


Psych: Mood and affect Normal


Triage Information Reviewed: Yes


Vital Signs On Initial Exam: 


 Initial Vitals











Temp Pulse Resp BP Pulse Ox


 


 97.8 F   77   18   118/74   96 


 


 07/06/19 19:54  07/06/19 19:54  07/06/19 19:54  07/06/19 19:54  07/06/19 19:54











Vital Signs Reviewed: Yes





Diagnostics





- Vital Signs


 Vital Signs











  Temp Pulse Resp BP Pulse Ox


 


 07/06/19 19:54  97.8 F  77  18  118/74  96














- Laboratory


Result Diagrams: 


 07/06/19 21:57





 07/06/19 21:57


Lab Statement: Any lab studies that have been ordered have been reviewed, and 

results considered in the medical decision making process.





- Radiology


  ** CXR


Radiology Interpretation Completed By: Radiologist


Summary of Radiographic Findings: No acute process. ED physician has reviewed 

this imaging report.





  ** cxr


Radiology Interpretation Completed By: Radiologist


Summary of Radiographic Findings: No acute process. ED physician has reviewed 

this imaging report.





- EKG


  ** 2001


Cardiac Rate: NL - 78 BPM


EKG Rhythm: Sinus Rhythm


EKG Comparison: No Significant Change - Unchanged from 12/21/2018


Summary of EKG Findings: Normal sinus rhythm at 78 bpm, prolonged CT, first 

degree AV block, normal QRS, normal QTc, normal axis, ST elevated in V1 and V2, 

normal T-waves, nonspecific





Re-Evaluation





- Re-Evaluation


  ** First Eval


Re-Evaluation Time: 01:30


Comment: The patient reports that he has hx hypomagnemesia.





  ** Second Eval


Re-Evaluation Time: 02:00


Comment: I discussed findings with the patient. We discussed discharge home.





Course/Dx


Course Of Treatment: The patient is a 72 y/o M presenting to [facility] with a chief 

complaint of sudden onset near syncope at 1700 with dizziness, pale pallor, and 

diaphoresis. He denies fever, chills, SOB, CP, palpitations, cough, vision 

changes, motor changes, numbness, and new edema. He thought the event was 

secondary to a sudden change in blood glucose as he is Type II diabetic, but 

glucose normal PTA. He reports that he's had occasional CP that radiates into 

the neck and jaw over the past few weeks while lying down, but it was not 

present today. Nuclear stress test within the last two years. Hx of DM type II, 

HTN, chronic bronchitis. Nonsmoker, occasional EtOH, no substance use. Upon 

physical exam, the patient exhibits no acute abnormalities. Blood work reveals 

RBCs of 4.13. hgb of 12.8, hct of 36, MPV of 7.1, BUN/Creatinine ratio of 26.0, 

glucose of 149, and magnesium of 1.4. UA is negative for infection. EKG reveals 

normal sinus rhythm at 78 bpm, prolonged CT, first degree AV block, normal QRS, 

normal QTc, normal axis, ST elevated in V1 and V2, normal T-waves, nonspecific. 

CXR reveals no acute process. In the ED course, the patient was administered 

magnesium sulfate. I spoke with Dr. Flores, hospitalist, at [phone] concerning 

possible admission of the patient since he is near syncope and 72 y/o. He does 

not have a reason to admit the patient at this time. The patient is diagnosed 

with pre-syncope and hypomagnesemia. He agrees with discharge plan.





- Diagnoses


Provider Diagnoses: 


 Pre-syncope, Hypomagnesemia








- Physician Notifications


Discussed Care of Patient With: Milton Flores - hospitalist


Time Discussed With Above Provider: 00:03


Instructed by Provider To: Other - I spoke with Dr. Flores concerning the 

patient. He does not believe the patient meets criteria for admission at this 

time.





Discharge





- Sign-Out/Discharge


Documenting (check all that apply): Patient Departure - Patient will be 

discharged home.


Patient Received Moderate/Deep Sedation with Procedure: No





- Discharge Plan


Condition: Stable


Disposition: HOME


Patient Education Materials:  Hypomagnesemia (ED), Near Syncope (ED)


Print Language: ENGLISH


Referrals: 


Zulay Forbes MD [Primary Care Provider] - 





- Billing Disposition and Condition


Condition: STABLE


Disposition: Home





- Attestation Statements


Document Initiated by Krupa: Yes


Documenting Scribe: Torrie Duong


Provider For Whom Krupa is Documenting (Include Credential): Dr. Keily Robbins MD


Scribe Attestation: 


Torrie MISHRA scribed for Dr. Keily Robbins MD on 07/07/19 at 

0825. 


Scribe Documentation Reviewed: Yes


Provider Attestation: 


The documentation as recorded by the Torrie cristobal accurately reflects 

the service I personally performed and the decisions made by me, Dr. Keily Robbins MD


Status of Scribdari Document: Viewed

## 2019-07-07 VITALS — DIASTOLIC BLOOD PRESSURE: 77 MMHG | SYSTOLIC BLOOD PRESSURE: 142 MMHG

## 2019-07-07 LAB
MAGNESIUM SERPL-MCNC: 1.4 MG/DL (ref 1.9–2.7)
TROPONIN I SERPL-MCNC: 0.01 NG/ML (ref ?–0.04)